# Patient Record
(demographics unavailable — no encounter records)

---

## 2024-10-14 NOTE — CHIEF COMPLAINT
[Urgent Visit] : Urgent Visit [FreeTextEntry1] : Patient is an 81-year-old female presents for an urgent same-day visit with complaints of dysuria and frequency.  Urine dip shows positive for small leukocytes and blood.  Most likely urinary tract infection.  Will prescribe Macrobid and Pyridium and send a UA CNS.  Patient counseled

## 2024-10-22 NOTE — ASSESSMENT
[FreeTextEntry1] : acute sinus- zpak and Flonase ns.  HTN- good control. Continue atenolol 25mg daily  Return if worse or persist.

## 2024-10-22 NOTE — HISTORY OF PRESENT ILLNESS
[de-identified] : Pt here today with 2 cough and congestion pt self med with flonase and tesslaon.

## 2025-07-07 NOTE — HISTORY OF PRESENT ILLNESS
[TextBox_4] : Radhika is an 83 y/o who presents today for Prolia injection.  her  of 60+ yeasr passed a few weeks ago. [BoneDensityDate] : 7/5/23

## 2025-07-28 NOTE — HISTORY OF PRESENT ILLNESS
[FreeTextEntry1] : Patient is an 82-year-old female who presents for a gynecologic evaluation.  Patient has no complaints.  Patient is being treated for osteoporosis with Prolia.  Patient's last mammogram was March 2025 she has dense breasts.  Patient's last bone density was July 2023.

## 2025-07-28 NOTE — DISCUSSION/SUMMARY
[FreeTextEntry1] : Impression: Vulvovaginal atrophy, history of endometrial cancer, history of hysterectomy, osteoporosis, dense breasts  Recommendations: Self breast exam, mammography and breast sonogram annually,, bone density DEXA, calcium and vitamin D supplementation regular weightbearing exercise, continue treatment with Prolia  Follow-up in 1 year